# Patient Record
Sex: FEMALE | Race: WHITE | URBAN - NONMETROPOLITAN AREA
[De-identification: names, ages, dates, MRNs, and addresses within clinical notes are randomized per-mention and may not be internally consistent; named-entity substitution may affect disease eponyms.]

---

## 2022-02-14 ENCOUNTER — LAB OUTSIDE AN ENCOUNTER (OUTPATIENT)
Dept: URBAN - NONMETROPOLITAN AREA CLINIC 2 | Facility: CLINIC | Age: 67
End: 2022-02-14

## 2022-02-14 ENCOUNTER — OFFICE VISIT (OUTPATIENT)
Dept: URBAN - NONMETROPOLITAN AREA CLINIC 2 | Facility: CLINIC | Age: 67
End: 2022-02-14
Payer: MEDICARE

## 2022-02-14 ENCOUNTER — WEB ENCOUNTER (OUTPATIENT)
Dept: URBAN - NONMETROPOLITAN AREA CLINIC 2 | Facility: CLINIC | Age: 67
End: 2022-02-14

## 2022-02-14 VITALS
WEIGHT: 170 LBS | SYSTOLIC BLOOD PRESSURE: 115 MMHG | BODY MASS INDEX: 30.12 KG/M2 | HEART RATE: 76 BPM | HEIGHT: 63 IN | DIASTOLIC BLOOD PRESSURE: 77 MMHG

## 2022-02-14 DIAGNOSIS — R93.5 ABNORMAL COMPUTED TOMOGRAPHY OF ABDOMEN AND PELVIS: ICD-10-CM

## 2022-02-14 DIAGNOSIS — R10.9 ABDOMINAL DISCOMFORT: ICD-10-CM

## 2022-02-14 DIAGNOSIS — Z72.0 TOBACCO USE: ICD-10-CM

## 2022-02-14 DIAGNOSIS — Z87.19 HISTORY OF PANCREATITIS: ICD-10-CM

## 2022-02-14 DIAGNOSIS — Z12.11 COLON CANCER SCREENING: ICD-10-CM

## 2022-02-14 PROCEDURE — 99204 OFFICE O/P NEW MOD 45 MIN: CPT | Performed by: INTERNAL MEDICINE

## 2022-02-14 RX ORDER — DICYCLOMINE HYDROCHLORIDE 20 MG/1
TAKE 1 TABLET BY MOUTH 2 TIMES DAILY FOR 10 DAYS TABLET ORAL
Qty: 20 | Refills: 0 | Status: ACTIVE | COMMUNITY

## 2022-02-14 RX ORDER — PROMETHAZINE HYDROCHLORIDE 12.5 MG/1
TAKE 1 TABLET BY MOUTH EVERY 8 HOURS AS NEEDED FOR 10 DAYS TABLET ORAL
Qty: 20 | Refills: 0 | Status: ACTIVE | COMMUNITY

## 2022-02-14 RX ORDER — ONDANSETRON 4 MG/1
DISSOLVE 1 TABLET BY MOUTH EVERY 8 HOURS AS NEEDED FOR NAUSEA TABLET, ORALLY DISINTEGRATING ORAL
Qty: 12 | Refills: 0 | Status: ACTIVE | COMMUNITY

## 2022-02-14 RX ORDER — TRAZODONE HYDROCHLORIDE 100 MG/1
TAKE 1 TABLET BY MOUTH AT NIGHT AS NEEDED FOR 30 DAYS TABLET ORAL
Qty: 30 | Refills: 0 | Status: ACTIVE | COMMUNITY

## 2022-02-14 RX ORDER — PANTOPRAZOLE SODIUM 40 MG/1
TAKE 1 TABLET BY MOUTH EVERY DAY TABLET, DELAYED RELEASE ORAL
Qty: 20 | Refills: 0 | Status: ACTIVE | COMMUNITY

## 2022-02-14 RX ORDER — ZOLPIDEM TARTRATE 10 MG/1
TAKE 1 TABLET BY MOUTH EVERYDAY AT BEDTIME TABLET ORAL
Qty: 30 | Refills: 1 | Status: ACTIVE | COMMUNITY

## 2022-02-14 RX ORDER — ALPRAZOLAM 0.5 MG/1
TAKE 1 TABLET BY MOUTH 1-2 TIMES DAY AS NEEDED FOR ANXIETY TABLET ORAL
Qty: 14 | Refills: 0 | Status: ACTIVE | COMMUNITY

## 2022-02-14 RX ORDER — POLYETHYLENE GLYCOL 3350, SODIUM SULFATE, SODIUM CHLORIDE, POTASSIUM CHLORIDE, ASCORBIC ACID, SODIUM ASCORBATE 140-9-5.2G
AS DIRECTED KIT ORAL AS DIRECTED
Qty: 280 GRAM | Refills: 0 | OUTPATIENT
Start: 2022-02-14 | End: 2022-02-15

## 2022-02-14 RX ORDER — ONDANSETRON 4 MG/1
TAKE 1 TABLET BY MOUTH EVERY 8 HOURS AS NEEDED FOR 10 DAYS TABLET, FILM COATED ORAL
Qty: 30 | Refills: 0 | Status: ACTIVE | COMMUNITY

## 2022-02-14 RX ORDER — ACETAMINOPHEN AND CODEINE PHOSPHATE 300; 30 MG/1; MG/1
TAKE 1-2 TABLETS BY MOUTH EVERY 6 HOURS AS NEEDED FOR PAIN TABLET ORAL
Qty: 25 | Refills: 0 | Status: ACTIVE | COMMUNITY

## 2022-02-14 RX ORDER — BUSPIRONE HYDROCHLORIDE 15 MG/1
TAKE 1 TABLET BY MOUTH TWICE A DAY FOR 30 DAYS TABLET ORAL
Qty: 180 | Refills: 0 | Status: ACTIVE | COMMUNITY

## 2022-02-14 RX ORDER — OMEPRAZOLE 20 MG/1
1 CAPSULE CAPSULE, DELAYED RELEASE ORAL EVERY 12 HOURS
Qty: 180 CAPSULE | Refills: 1 | OUTPATIENT
Start: 2022-02-14

## 2022-02-14 NOTE — HPI-TODAY'S VISIT:
2/14/2022: Initial Gastroenterology Clinic Visit Ms. Bryan is a 66 year old female wtih past medical history of chronic tobacco use, lupus, arthritis, hyperlipidemia, anxiety/depression, who presents for evaluation of pancreatitis.  Ms. Bryan presents with a family member.  She experienced epigastric discomfort in January 2022 which would occur every 30 minutes and last for 10 minutes before returning. She did not associate the discomfort with the consumption of food. To evaluate, underwent CT A/P 1/31/2022 which showed mild fat stranding surrounding the pancreatic head and adjacent duodenum which could represent pancreatitis or peptic ulcer disease, fluid filled milldy prominent loop of small bowel which could represent enteritis. Underwent abdominal ultrasound 2/10/2022 to evaluate for cholelithiasis and assess if gallstones may have led to pancreatitis. Ultrasound showed normal right upper quadrant ultrasound without gallstones. CBC, chemistry panel, LFTs normal.  She did acknowledge being on naproxen 3x per week for arthritis.  Denies alcohol use but has smoked cigarettes for many years. Denies new medications. Denies changes in diet. Denies any recent illnesses.    Ms. Bryan does not currently work. She has not undergone colon cancer screening.

## 2022-03-03 ENCOUNTER — TELEPHONE ENCOUNTER (OUTPATIENT)
Dept: URBAN - NONMETROPOLITAN AREA CLINIC 2 | Facility: CLINIC | Age: 67
End: 2022-03-03

## 2022-03-25 ENCOUNTER — TELEPHONE ENCOUNTER (OUTPATIENT)
Dept: URBAN - NONMETROPOLITAN AREA CLINIC 2 | Facility: CLINIC | Age: 67
End: 2022-03-25

## 2022-04-07 ENCOUNTER — CLAIMS CREATED FROM THE CLAIM WINDOW (OUTPATIENT)
Dept: URBAN - METROPOLITAN AREA CLINIC 4 | Facility: CLINIC | Age: 67
End: 2022-04-07
Payer: MEDICARE

## 2022-04-07 ENCOUNTER — OFFICE VISIT (OUTPATIENT)
Dept: URBAN - NONMETROPOLITAN AREA SURGERY CENTER 1 | Facility: SURGERY CENTER | Age: 67
End: 2022-04-07
Payer: MEDICARE

## 2022-04-07 DIAGNOSIS — K29.60 OTHER GASTRITIS WITHOUT BLEEDING: ICD-10-CM

## 2022-04-07 DIAGNOSIS — K29.60 ADENOPAPILLOMATOSIS GASTRICA: ICD-10-CM

## 2022-04-07 DIAGNOSIS — K29.80 ACUTE DUODENITIS: ICD-10-CM

## 2022-04-07 DIAGNOSIS — D12.2 ADENOMA OF ASCENDING COLON: ICD-10-CM

## 2022-04-07 DIAGNOSIS — B96.81 HELICOBACTER PYLORI [H. PYLORI] AS THE CAUSE OF DISEASES CLASSIFIED ELSEWHERE: ICD-10-CM

## 2022-04-07 DIAGNOSIS — B96.81 BACTERIAL INFECTION DUE TO H. PYLORI: ICD-10-CM

## 2022-04-07 DIAGNOSIS — D12.2 BENIGN NEOPLASM OF ASCENDING COLON: ICD-10-CM

## 2022-04-07 DIAGNOSIS — Z12.11 COLON CANCER SCREENING: ICD-10-CM

## 2022-04-07 DIAGNOSIS — K29.81 DUODENITIS WITH BLEEDING: ICD-10-CM

## 2022-04-07 DIAGNOSIS — K63.89 CYST OF DUODENUM: ICD-10-CM

## 2022-04-07 PROCEDURE — 88342 IMHCHEM/IMCYTCHM 1ST ANTB: CPT | Performed by: PATHOLOGY

## 2022-04-07 PROCEDURE — 45385 COLONOSCOPY W/LESION REMOVAL: CPT | Performed by: INTERNAL MEDICINE

## 2022-04-07 PROCEDURE — 88305 TISSUE EXAM BY PATHOLOGIST: CPT | Performed by: PATHOLOGY

## 2022-04-07 PROCEDURE — G8907 PT DOC NO EVENTS ON DISCHARG: HCPCS | Performed by: INTERNAL MEDICINE

## 2022-04-07 PROCEDURE — 88341 IMHCHEM/IMCYTCHM EA ADD ANTB: CPT | Performed by: PATHOLOGY

## 2022-04-07 PROCEDURE — 43239 EGD BIOPSY SINGLE/MULTIPLE: CPT | Performed by: INTERNAL MEDICINE

## 2022-04-07 RX ORDER — TRAZODONE HYDROCHLORIDE 100 MG/1
TAKE 1 TABLET BY MOUTH AT NIGHT AS NEEDED FOR 30 DAYS TABLET ORAL
Qty: 30 | Refills: 0 | Status: ACTIVE | COMMUNITY

## 2022-04-07 RX ORDER — ALPRAZOLAM 0.5 MG/1
TAKE 1 TABLET BY MOUTH 1-2 TIMES DAY AS NEEDED FOR ANXIETY TABLET ORAL
Qty: 14 | Refills: 0 | Status: ACTIVE | COMMUNITY

## 2022-04-07 RX ORDER — PROMETHAZINE HYDROCHLORIDE 12.5 MG/1
TAKE 1 TABLET BY MOUTH EVERY 8 HOURS AS NEEDED FOR 10 DAYS TABLET ORAL
Qty: 20 | Refills: 0 | Status: ACTIVE | COMMUNITY

## 2022-04-07 RX ORDER — BUSPIRONE HYDROCHLORIDE 15 MG/1
TAKE 1 TABLET BY MOUTH TWICE A DAY FOR 30 DAYS TABLET ORAL
Qty: 180 | Refills: 0 | Status: ACTIVE | COMMUNITY

## 2022-04-07 RX ORDER — DICYCLOMINE HYDROCHLORIDE 20 MG/1
TAKE 1 TABLET BY MOUTH 2 TIMES DAILY FOR 10 DAYS TABLET ORAL
Qty: 20 | Refills: 0 | Status: ACTIVE | COMMUNITY

## 2022-04-07 RX ORDER — ONDANSETRON 4 MG/1
DISSOLVE 1 TABLET BY MOUTH EVERY 8 HOURS AS NEEDED FOR NAUSEA TABLET, ORALLY DISINTEGRATING ORAL
Qty: 12 | Refills: 0 | Status: ACTIVE | COMMUNITY

## 2022-04-07 RX ORDER — ONDANSETRON 4 MG/1
TAKE 1 TABLET BY MOUTH EVERY 8 HOURS AS NEEDED FOR 10 DAYS TABLET, FILM COATED ORAL
Qty: 30 | Refills: 0 | Status: ACTIVE | COMMUNITY

## 2022-04-07 RX ORDER — OMEPRAZOLE 20 MG/1
1 CAPSULE CAPSULE, DELAYED RELEASE ORAL EVERY 12 HOURS
Qty: 180 CAPSULE | Refills: 1 | Status: ACTIVE | COMMUNITY
Start: 2022-02-14

## 2022-04-07 RX ORDER — ACETAMINOPHEN AND CODEINE PHOSPHATE 300; 30 MG/1; MG/1
TAKE 1-2 TABLETS BY MOUTH EVERY 6 HOURS AS NEEDED FOR PAIN TABLET ORAL
Qty: 25 | Refills: 0 | Status: ACTIVE | COMMUNITY

## 2022-04-07 RX ORDER — PANTOPRAZOLE SODIUM 40 MG/1
TAKE 1 TABLET BY MOUTH EVERY DAY TABLET, DELAYED RELEASE ORAL
Qty: 20 | Refills: 0 | Status: ACTIVE | COMMUNITY

## 2022-04-07 RX ORDER — ZOLPIDEM TARTRATE 10 MG/1
TAKE 1 TABLET BY MOUTH EVERYDAY AT BEDTIME TABLET ORAL
Qty: 30 | Refills: 1 | Status: ACTIVE | COMMUNITY

## 2022-04-18 ENCOUNTER — OFFICE VISIT (OUTPATIENT)
Dept: URBAN - NONMETROPOLITAN AREA CLINIC 2 | Facility: CLINIC | Age: 67
End: 2022-04-18
Payer: MEDICARE

## 2022-04-18 VITALS
SYSTOLIC BLOOD PRESSURE: 119 MMHG | BODY MASS INDEX: 30.12 KG/M2 | WEIGHT: 170 LBS | DIASTOLIC BLOOD PRESSURE: 90 MMHG | HEART RATE: 81 BPM | HEIGHT: 63 IN

## 2022-04-18 DIAGNOSIS — K25.9 GASTRIC ULCER WITHOUT HEMORRHAGE OR PERFORATION, UNSPECIFIED CHRONICITY: ICD-10-CM

## 2022-04-18 DIAGNOSIS — Z87.19 HISTORY OF PANCREATITIS: ICD-10-CM

## 2022-04-18 DIAGNOSIS — A04.8 HELICOBACTER PYLORI (H. PYLORI) INFECTION: ICD-10-CM

## 2022-04-18 DIAGNOSIS — Z86.010 PERSONAL HISTORY OF COLONIC POLYPS: ICD-10-CM

## 2022-04-18 DIAGNOSIS — R93.5 ABNORMAL COMPUTED TOMOGRAPHY OF ABDOMEN AND PELVIS: ICD-10-CM

## 2022-04-18 DIAGNOSIS — Z72.0 TOBACCO USE: ICD-10-CM

## 2022-04-18 PROCEDURE — 99214 OFFICE O/P EST MOD 30 MIN: CPT | Performed by: INTERNAL MEDICINE

## 2022-04-18 RX ORDER — ONDANSETRON 4 MG/1
TAKE 1 TABLET BY MOUTH EVERY 8 HOURS AS NEEDED FOR 10 DAYS TABLET, FILM COATED ORAL
Qty: 30 | Refills: 0 | Status: ACTIVE | COMMUNITY

## 2022-04-18 RX ORDER — ALPRAZOLAM 0.5 MG/1
TAKE 1 TABLET BY MOUTH 1-2 TIMES DAY AS NEEDED FOR ANXIETY TABLET ORAL
Qty: 14 | Refills: 0 | Status: ACTIVE | COMMUNITY

## 2022-04-18 RX ORDER — ACETAMINOPHEN AND CODEINE PHOSPHATE 300; 30 MG/1; MG/1
TAKE 1-2 TABLETS BY MOUTH EVERY 6 HOURS AS NEEDED FOR PAIN TABLET ORAL
Qty: 25 | Refills: 0 | Status: ACTIVE | COMMUNITY

## 2022-04-18 RX ORDER — DICYCLOMINE HYDROCHLORIDE 20 MG/1
TAKE 1 TABLET BY MOUTH 2 TIMES DAILY FOR 10 DAYS TABLET ORAL
Qty: 20 | Refills: 0 | Status: ACTIVE | COMMUNITY

## 2022-04-18 RX ORDER — OMEPRAZOLE 20 MG/1
1 CAPSULE CAPSULE, DELAYED RELEASE ORAL EVERY 12 HOURS
Qty: 180 CAPSULE | Refills: 3 | OUTPATIENT
Start: 2022-04-18

## 2022-04-18 RX ORDER — ONDANSETRON 4 MG/1
DISSOLVE 1 TABLET BY MOUTH EVERY 8 HOURS AS NEEDED FOR NAUSEA TABLET, ORALLY DISINTEGRATING ORAL
Qty: 12 | Refills: 0 | Status: ACTIVE | COMMUNITY

## 2022-04-18 RX ORDER — ZOLPIDEM TARTRATE 10 MG/1
TAKE 1 TABLET BY MOUTH EVERYDAY AT BEDTIME TABLET ORAL
Qty: 30 | Refills: 1 | Status: ACTIVE | COMMUNITY

## 2022-04-18 RX ORDER — BUSPIRONE HYDROCHLORIDE 15 MG/1
TAKE 1 TABLET BY MOUTH TWICE A DAY FOR 30 DAYS TABLET ORAL
Qty: 180 | Refills: 0 | Status: ACTIVE | COMMUNITY

## 2022-04-18 RX ORDER — SODIUM SULFATE, MAGNESIUM SULFATE, AND POTASSIUM CHLORIDE 17.75; 2.7; 2.25 G/1; G/1; G/1
12 TABLETS TABLET ORAL
Qty: 24 TABLETS | Refills: 0 | OUTPATIENT
Start: 2022-04-18 | End: 2022-04-19

## 2022-04-18 RX ORDER — PROMETHAZINE HYDROCHLORIDE 12.5 MG/1
TAKE 1 TABLET BY MOUTH EVERY 8 HOURS AS NEEDED FOR 10 DAYS TABLET ORAL
Qty: 20 | Refills: 0 | Status: ACTIVE | COMMUNITY

## 2022-04-18 RX ORDER — PANTOPRAZOLE SODIUM 40 MG/1
TAKE 1 TABLET BY MOUTH EVERY DAY TABLET, DELAYED RELEASE ORAL
Qty: 20 | Refills: 0 | Status: ACTIVE | COMMUNITY

## 2022-04-18 RX ORDER — OMEPRAZOLE 20 MG/1
1 CAPSULE CAPSULE, DELAYED RELEASE ORAL EVERY 12 HOURS
Qty: 180 CAPSULE | Refills: 1 | OUTPATIENT

## 2022-04-18 RX ORDER — TRAZODONE HYDROCHLORIDE 100 MG/1
TAKE 1 TABLET BY MOUTH AT NIGHT AS NEEDED FOR 30 DAYS TABLET ORAL
Qty: 30 | Refills: 0 | Status: ACTIVE | COMMUNITY

## 2022-04-18 RX ORDER — BISMUTH SUBCITRATE POTASSIUM, METRONIDAZOLE, TETRACYCLINE HYDROCHLORIDE 140; 125; 125 MG/1; MG/1; MG/1
3 CAPSULES AFTER MEALS AND AT BEDTIME CAPSULE ORAL
Qty: 120 | OUTPATIENT
Start: 2022-04-18 | End: 2022-04-28

## 2022-04-18 NOTE — HPI-TODAY'S VISIT:
2/14/2022: Initial Gastroenterology Clinic Visit   Ms. Bryan is a 66 year old female wtih past medical history of chronic tobacco use, lupus, arthritis, hyperlipidemia, anxiety/depression, who presents for evaluation of pancreatitis.  Ms. Bryan presents with a family member.  She experienced epigastric discomfort in January 2022 which would occur every 30 minutes and last for 10 minutes before returning. She did not associate the discomfort with the consumption of food. To evaluate, underwent CT A/P 1/31/2022 which showed mild fat stranding surrounding the pancreatic head and adjacent duodenum which could represent pancreatitis or peptic ulcer disease, fluid filled milldy prominent loop of small bowel which could represent enteritis. Underwent abdominal ultrasound 2/10/2022 to evaluate for cholelithiasis and assess if gallstones may have led to pancreatitis. Ultrasound showed normal right upper quadrant ultrasound without gallstones. CBC, chemistry panel, LFTs normal.  She did acknowledge being on naproxen 3x per week for arthritis.  Denies alcohol use but has smoked cigarettes for many years. Denies new medications. Denies changes in diet. Denies any recent illnesses.    Ms. Bryan does not currently work. She has not undergone colon cancer screening.  4/7/2022: EGD One linear esophageal ulcer was found at the distal esophagus measuring 3 mm in size.  3 cm hiatal hernia was present. Two non-bleeding gastric ulcers with no bleeding were found in the gastric antrum with largest measuring 5 mm. Biopsied. Congested mucosa without active bleeding was found in the duodenal bulb, first portion of the duodenum, and second portion of the duodenum. Biopsied. 4/7/2022: Pathology from EGD (A) Duodenum, Bulb, Biopsy: ACTIVE PEPTIC DUODENITIS, PROBABLY SECONDARY TO H. PYLORI INFECTION; H. PYLORI ORGANISMS IDENTIFIED. Negative for Sprue, Dysplasia or Malignancy. (B) Stomach, Body, Biopsy: CHRONIC HELICOBACTER GASTRITIS; H. PYLORI ORGANISMS IDENTIFIED. NO EVIDENCE OF INTESTINAL METAPLASIA. Negative For Dysplasia or Malignancy. 4/7/2022: Colonoscopy  Seven sessile polyps were found in the ascending colon ranging from 5-15 mm. Polypectomy performed with cold snare with largest polyp removed in piecemeal fashion.  4 mm polyp in the descending colon. Polypectomy performed. 10 mm polyp in the sigmoid colon. Polypectomy performed. Multiple small and large mouthed diverticula were found in the descending colon and sigmoid colon. 4/7/2022: Pathology from Colonoscopy  (C) Colon, Ascending, Polypectomy: TUBULAR ADENOMA(S). (D) Colon, Descending, Polypectomy: HYPERPLASTIC POLYP(S). (E) Colon, Sigmoid, Polypectomy: HYPERPLASTIC POLYP(S).  4/18/2022: Gastroenterology Follow-Up Visit Ms. Bryan acknowledges that she was unable to undergo the MRI Pancreas. She does continue to smoke. She is in the process of decreasing NSAID use. She does have epigastric discomfort although this has slightly improved with PPI. Denies melena, hematochezia, unintentional weight loss.

## 2022-05-04 PROBLEM — 305058001: Status: ACTIVE | Noted: 2022-02-14

## 2022-07-20 ENCOUNTER — TELEPHONE ENCOUNTER (OUTPATIENT)
Dept: URBAN - NONMETROPOLITAN AREA CLINIC 2 | Facility: CLINIC | Age: 67
End: 2022-07-20

## 2022-08-10 PROBLEM — 365980008: Status: ACTIVE | Noted: 2022-02-26

## 2022-08-10 PROBLEM — 85921000119107: Status: ACTIVE | Noted: 2022-02-26

## 2022-08-19 ENCOUNTER — OFFICE VISIT (OUTPATIENT)
Dept: URBAN - NONMETROPOLITAN AREA SURGERY CENTER 1 | Facility: SURGERY CENTER | Age: 67
End: 2022-08-19

## 2022-08-26 ENCOUNTER — TELEPHONE ENCOUNTER (OUTPATIENT)
Dept: URBAN - NONMETROPOLITAN AREA CLINIC 2 | Facility: CLINIC | Age: 67
End: 2022-08-26

## 2022-08-29 ENCOUNTER — OFFICE VISIT (OUTPATIENT)
Dept: URBAN - NONMETROPOLITAN AREA CLINIC 2 | Facility: CLINIC | Age: 67
End: 2022-08-29

## 2022-09-14 ENCOUNTER — TELEPHONE ENCOUNTER (OUTPATIENT)
Dept: URBAN - NONMETROPOLITAN AREA CLINIC 2 | Facility: CLINIC | Age: 67
End: 2022-09-14

## 2022-09-14 RX ORDER — PROMETHAZINE HYDROCHLORIDE 12.5 MG/1
TAKE 1 TABLET BY MOUTH EVERY 8 HOURS AS NEEDED FOR 10 DAYS TABLET ORAL
Qty: 20 | Refills: 0 | Status: ACTIVE | COMMUNITY

## 2022-09-14 RX ORDER — ONDANSETRON 4 MG/1
TAKE 1 TABLET BY MOUTH EVERY 8 HOURS AS NEEDED FOR 10 DAYS TABLET, FILM COATED ORAL
Qty: 30 | Refills: 0 | Status: ACTIVE | COMMUNITY

## 2022-09-14 RX ORDER — PANTOPRAZOLE SODIUM 40 MG/1
TAKE 1 TABLET BY MOUTH EVERY DAY TABLET, DELAYED RELEASE ORAL
Qty: 20 | Refills: 0 | Status: ACTIVE | COMMUNITY

## 2022-09-14 RX ORDER — TRAZODONE HYDROCHLORIDE 100 MG/1
TAKE 1 TABLET BY MOUTH AT NIGHT AS NEEDED FOR 30 DAYS TABLET ORAL
Qty: 30 | Refills: 0 | Status: ACTIVE | COMMUNITY

## 2022-09-14 RX ORDER — BUSPIRONE HYDROCHLORIDE 15 MG/1
TAKE 1 TABLET BY MOUTH TWICE A DAY FOR 30 DAYS TABLET ORAL
Qty: 180 | Refills: 0 | Status: ACTIVE | COMMUNITY

## 2022-09-14 RX ORDER — ALPRAZOLAM 0.5 MG/1
TAKE 1 TABLET BY MOUTH 1-2 TIMES DAY AS NEEDED FOR ANXIETY TABLET ORAL
Qty: 14 | Refills: 0 | Status: ACTIVE | COMMUNITY

## 2022-09-14 RX ORDER — ZOLPIDEM TARTRATE 10 MG/1
TAKE 1 TABLET BY MOUTH EVERYDAY AT BEDTIME TABLET ORAL
Qty: 30 | Refills: 1 | Status: ACTIVE | COMMUNITY

## 2022-09-14 RX ORDER — OMEPRAZOLE 20 MG/1
1 CAPSULE CAPSULE, DELAYED RELEASE ORAL EVERY 12 HOURS
Qty: 180 CAPSULE | Refills: 1 | Status: ACTIVE | COMMUNITY

## 2022-09-14 RX ORDER — LORAZEPAM 0.5 MG/1
1 TABLET TABLET ORAL
Qty: 1 TABLET | Refills: 0 | OUTPATIENT
Start: 2022-09-14

## 2022-09-14 RX ORDER — ACETAMINOPHEN AND CODEINE PHOSPHATE 300; 30 MG/1; MG/1
TAKE 1-2 TABLETS BY MOUTH EVERY 6 HOURS AS NEEDED FOR PAIN TABLET ORAL
Qty: 25 | Refills: 0 | Status: ACTIVE | COMMUNITY

## 2022-09-14 RX ORDER — DICYCLOMINE HYDROCHLORIDE 20 MG/1
TAKE 1 TABLET BY MOUTH 2 TIMES DAILY FOR 10 DAYS TABLET ORAL
Qty: 20 | Refills: 0 | Status: ACTIVE | COMMUNITY

## 2022-09-14 RX ORDER — OMEPRAZOLE 20 MG/1
1 CAPSULE CAPSULE, DELAYED RELEASE ORAL EVERY 12 HOURS
Qty: 180 CAPSULE | Refills: 3 | Status: ACTIVE | COMMUNITY
Start: 2022-04-18

## 2022-09-14 RX ORDER — SODIUM SULFATE, MAGNESIUM SULFATE, AND POTASSIUM CHLORIDE 17.75; 2.7; 2.25 G/1; G/1; G/1
12 TABLETS TABLET ORAL
Qty: 24 TABLETS | Refills: 0 | OUTPATIENT
Start: 2022-09-15 | End: 2022-09-16

## 2022-09-14 RX ORDER — ONDANSETRON 4 MG/1
DISSOLVE 1 TABLET BY MOUTH EVERY 8 HOURS AS NEEDED FOR NAUSEA TABLET, ORALLY DISINTEGRATING ORAL
Qty: 12 | Refills: 0 | Status: ACTIVE | COMMUNITY

## 2022-09-15 PROBLEM — 15634181000119107: Status: ACTIVE | Noted: 2022-02-14

## 2022-09-20 ENCOUNTER — TELEPHONE ENCOUNTER (OUTPATIENT)
Dept: URBAN - NONMETROPOLITAN AREA CLINIC 2 | Facility: CLINIC | Age: 67
End: 2022-09-20

## 2022-09-23 ENCOUNTER — OFFICE VISIT (OUTPATIENT)
Dept: URBAN - NONMETROPOLITAN AREA SURGERY CENTER 1 | Facility: SURGERY CENTER | Age: 67
End: 2022-09-23
Payer: MEDICARE

## 2022-09-23 ENCOUNTER — CLAIMS CREATED FROM THE CLAIM WINDOW (OUTPATIENT)
Dept: URBAN - METROPOLITAN AREA CLINIC 4 | Facility: CLINIC | Age: 67
End: 2022-09-23
Payer: MEDICARE

## 2022-09-23 ENCOUNTER — TELEPHONE ENCOUNTER (OUTPATIENT)
Dept: URBAN - NONMETROPOLITAN AREA CLINIC 2 | Facility: CLINIC | Age: 67
End: 2022-09-23

## 2022-09-23 DIAGNOSIS — K29.60 OTHER GASTRITIS WITHOUT BLEEDING: ICD-10-CM

## 2022-09-23 DIAGNOSIS — K29.80 ACUTE DUODENITIS: ICD-10-CM

## 2022-09-23 DIAGNOSIS — Z53.8 FAILED ATTEMPTED SURGICAL PROCEDURE: ICD-10-CM

## 2022-09-23 DIAGNOSIS — K29.81 DUODENITIS WITH BLEEDING: ICD-10-CM

## 2022-09-23 DIAGNOSIS — B96.81 BACTERIAL INFECTION DUE TO H. PYLORI: ICD-10-CM

## 2022-09-23 DIAGNOSIS — K29.60 ADENOPAPILLOMATOSIS GASTRICA: ICD-10-CM

## 2022-09-23 DIAGNOSIS — B96.81 HELICOBACTER PYLORI [H. PYLORI] AS THE CAUSE OF DISEASES CLASSIFIED ELSEWHERE: ICD-10-CM

## 2022-09-23 DIAGNOSIS — Z86.010 ADENOMAS PERSONAL HISTORY OF COLONIC POLYPS: ICD-10-CM

## 2022-09-23 PROBLEM — 30811009: Status: ACTIVE | Noted: 2022-09-23

## 2022-09-23 PROCEDURE — 88341 IMHCHEM/IMCYTCHM EA ADD ANTB: CPT | Performed by: PATHOLOGY

## 2022-09-23 PROCEDURE — 88305 TISSUE EXAM BY PATHOLOGIST: CPT | Performed by: PATHOLOGY

## 2022-09-23 PROCEDURE — 43239 EGD BIOPSY SINGLE/MULTIPLE: CPT | Performed by: INTERNAL MEDICINE

## 2022-09-23 PROCEDURE — G0105 COLORECTAL SCRN; HI RISK IND: HCPCS | Performed by: INTERNAL MEDICINE

## 2022-09-23 PROCEDURE — G8907 PT DOC NO EVENTS ON DISCHARG: HCPCS | Performed by: INTERNAL MEDICINE

## 2022-09-23 PROCEDURE — 88342 IMHCHEM/IMCYTCHM 1ST ANTB: CPT | Performed by: PATHOLOGY

## 2022-09-23 RX ORDER — ACETAMINOPHEN AND CODEINE PHOSPHATE 300; 30 MG/1; MG/1
TAKE 1-2 TABLETS BY MOUTH EVERY 6 HOURS AS NEEDED FOR PAIN TABLET ORAL
Qty: 25 | Refills: 0 | Status: ACTIVE | COMMUNITY

## 2022-09-23 RX ORDER — DICYCLOMINE HYDROCHLORIDE 20 MG/1
TAKE 1 TABLET BY MOUTH 2 TIMES DAILY FOR 10 DAYS TABLET ORAL
Qty: 20 | Refills: 0 | Status: ACTIVE | COMMUNITY

## 2022-09-23 RX ORDER — OMEPRAZOLE 20 MG/1
1 CAPSULE CAPSULE, DELAYED RELEASE ORAL EVERY 12 HOURS
Qty: 180 CAPSULE | Refills: 0 | OUTPATIENT
Start: 2022-09-23

## 2022-09-23 RX ORDER — PANTOPRAZOLE SODIUM 40 MG/1
TAKE 1 TABLET BY MOUTH EVERY DAY TABLET, DELAYED RELEASE ORAL
Qty: 20 | Refills: 0 | Status: ACTIVE | COMMUNITY

## 2022-09-23 RX ORDER — OMEPRAZOLE 20 MG/1
1 CAPSULE CAPSULE, DELAYED RELEASE ORAL EVERY 12 HOURS
Qty: 180 CAPSULE | Refills: 1 | Status: ACTIVE | COMMUNITY

## 2022-09-23 RX ORDER — ZOLPIDEM TARTRATE 10 MG/1
TAKE 1 TABLET BY MOUTH EVERYDAY AT BEDTIME TABLET ORAL
Qty: 30 | Refills: 1 | Status: ACTIVE | COMMUNITY

## 2022-09-23 RX ORDER — LORAZEPAM 0.5 MG/1
1 TABLET TABLET ORAL
Qty: 1 TABLET | Refills: 0 | Status: ACTIVE | COMMUNITY
Start: 2022-09-14

## 2022-09-23 RX ORDER — PROMETHAZINE HYDROCHLORIDE 12.5 MG/1
TAKE 1 TABLET BY MOUTH EVERY 8 HOURS AS NEEDED FOR 10 DAYS TABLET ORAL
Qty: 20 | Refills: 0 | Status: ACTIVE | COMMUNITY

## 2022-09-23 RX ORDER — ONDANSETRON 4 MG/1
DISSOLVE 1 TABLET BY MOUTH EVERY 8 HOURS AS NEEDED FOR NAUSEA TABLET, ORALLY DISINTEGRATING ORAL
Qty: 12 | Refills: 0 | Status: ACTIVE | COMMUNITY

## 2022-09-23 RX ORDER — BUSPIRONE HYDROCHLORIDE 15 MG/1
TAKE 1 TABLET BY MOUTH TWICE A DAY FOR 30 DAYS TABLET ORAL
Qty: 180 | Refills: 0 | Status: ACTIVE | COMMUNITY

## 2022-09-23 RX ORDER — OMEPRAZOLE 20 MG/1
1 CAPSULE CAPSULE, DELAYED RELEASE ORAL EVERY 12 HOURS
Qty: 180 CAPSULE | Refills: 3 | Status: ACTIVE | COMMUNITY
Start: 2022-04-18

## 2022-09-23 RX ORDER — TRAZODONE HYDROCHLORIDE 100 MG/1
TAKE 1 TABLET BY MOUTH AT NIGHT AS NEEDED FOR 30 DAYS TABLET ORAL
Qty: 30 | Refills: 0 | Status: ACTIVE | COMMUNITY

## 2022-09-23 RX ORDER — ONDANSETRON 4 MG/1
TAKE 1 TABLET BY MOUTH EVERY 8 HOURS AS NEEDED FOR 10 DAYS TABLET, FILM COATED ORAL
Qty: 30 | Refills: 0 | Status: ACTIVE | COMMUNITY

## 2022-09-23 RX ORDER — ALPRAZOLAM 0.5 MG/1
TAKE 1 TABLET BY MOUTH 1-2 TIMES DAY AS NEEDED FOR ANXIETY TABLET ORAL
Qty: 14 | Refills: 0 | Status: ACTIVE | COMMUNITY

## 2022-10-27 ENCOUNTER — TELEPHONE ENCOUNTER (OUTPATIENT)
Dept: URBAN - NONMETROPOLITAN AREA CLINIC 2 | Facility: CLINIC | Age: 67
End: 2022-10-27

## 2022-10-27 RX ORDER — ALPRAZOLAM 0.5 MG/1
TAKE 1 TABLET BY MOUTH 1-2 TIMES DAY AS NEEDED FOR ANXIETY TABLET ORAL
Qty: 14 | Refills: 0 | Status: ACTIVE | COMMUNITY

## 2022-10-27 RX ORDER — TRAZODONE HYDROCHLORIDE 100 MG/1
TAKE 1 TABLET BY MOUTH AT NIGHT AS NEEDED FOR 30 DAYS TABLET ORAL
Qty: 30 | Refills: 0 | Status: ACTIVE | COMMUNITY

## 2022-10-27 RX ORDER — OMEPRAZOLE 20 MG/1
1 CAPSULE CAPSULE, DELAYED RELEASE ORAL EVERY 12 HOURS
Qty: 180 CAPSULE | Refills: 0 | Status: ACTIVE | COMMUNITY
Start: 2022-09-23

## 2022-10-27 RX ORDER — OMEPRAZOLE 20 MG/1
1 CAPSULE CAPSULE, DELAYED RELEASE ORAL EVERY 12 HOURS
Qty: 180 CAPSULE | Refills: 1 | Status: ACTIVE | COMMUNITY

## 2022-10-27 RX ORDER — PROMETHAZINE HYDROCHLORIDE 12.5 MG/1
TAKE 1 TABLET BY MOUTH EVERY 8 HOURS AS NEEDED FOR 10 DAYS TABLET ORAL
Qty: 20 | Refills: 0 | Status: ACTIVE | COMMUNITY

## 2022-10-27 RX ORDER — BUSPIRONE HYDROCHLORIDE 15 MG/1
TAKE 1 TABLET BY MOUTH TWICE A DAY FOR 30 DAYS TABLET ORAL
Qty: 180 | Refills: 0 | Status: ACTIVE | COMMUNITY

## 2022-10-27 RX ORDER — LORAZEPAM 0.5 MG/1
1 TABLET TABLET ORAL
Qty: 1 TABLET | Refills: 0 | Status: ACTIVE | COMMUNITY
Start: 2022-09-14

## 2022-10-27 RX ORDER — PANTOPRAZOLE SODIUM 40 MG/1
TAKE 1 TABLET BY MOUTH EVERY DAY TABLET, DELAYED RELEASE ORAL
Qty: 20 | Refills: 0 | Status: ACTIVE | COMMUNITY

## 2022-10-27 RX ORDER — DICYCLOMINE HYDROCHLORIDE 20 MG/1
TAKE 1 TABLET BY MOUTH 2 TIMES DAILY FOR 10 DAYS TABLET ORAL
Qty: 20 | Refills: 0 | Status: ACTIVE | COMMUNITY

## 2022-10-27 RX ORDER — ACETAMINOPHEN AND CODEINE PHOSPHATE 300; 30 MG/1; MG/1
TAKE 1-2 TABLETS BY MOUTH EVERY 6 HOURS AS NEEDED FOR PAIN TABLET ORAL
Qty: 25 | Refills: 0 | Status: ACTIVE | COMMUNITY

## 2022-10-27 RX ORDER — ZOLPIDEM TARTRATE 10 MG/1
TAKE 1 TABLET BY MOUTH EVERYDAY AT BEDTIME TABLET ORAL
Qty: 30 | Refills: 1 | Status: ACTIVE | COMMUNITY

## 2022-10-27 RX ORDER — ONDANSETRON 4 MG/1
TAKE 1 TABLET BY MOUTH EVERY 8 HOURS AS NEEDED FOR 10 DAYS TABLET, FILM COATED ORAL
Qty: 30 | Refills: 0 | Status: ACTIVE | COMMUNITY

## 2022-10-27 RX ORDER — BISMUTH SUBCITRATE POTASSIUM, METRONIDAZOLE, TETRACYCLINE HYDROCHLORIDE 140; 125; 125 MG/1; MG/1; MG/1
3 CAPSULES AFTER MEALS AND AT BEDTIME CAPSULE ORAL
Qty: 120 | OUTPATIENT
Start: 2022-11-15 | End: 2022-11-25

## 2022-10-27 RX ORDER — ONDANSETRON 4 MG/1
DISSOLVE 1 TABLET BY MOUTH EVERY 8 HOURS AS NEEDED FOR NAUSEA TABLET, ORALLY DISINTEGRATING ORAL
Qty: 12 | Refills: 0 | Status: ACTIVE | COMMUNITY

## 2022-10-27 RX ORDER — OMEPRAZOLE 20 MG/1
1 CAPSULE CAPSULE, DELAYED RELEASE ORAL EVERY 12 HOURS
Qty: 180 CAPSULE | Refills: 3 | Status: ACTIVE | COMMUNITY
Start: 2022-04-18

## 2022-11-18 ENCOUNTER — TELEPHONE ENCOUNTER (OUTPATIENT)
Dept: URBAN - NONMETROPOLITAN AREA CLINIC 2 | Facility: CLINIC | Age: 67
End: 2022-11-18

## 2022-11-28 ENCOUNTER — OFFICE VISIT (OUTPATIENT)
Dept: URBAN - NONMETROPOLITAN AREA CLINIC 2 | Facility: CLINIC | Age: 67
End: 2022-11-28

## 2023-03-23 ENCOUNTER — OFFICE VISIT (OUTPATIENT)
Dept: URBAN - NONMETROPOLITAN AREA CLINIC 13 | Facility: CLINIC | Age: 68
End: 2023-03-23

## 2023-05-19 ENCOUNTER — OFFICE VISIT (OUTPATIENT)
Dept: URBAN - NONMETROPOLITAN AREA CLINIC 13 | Facility: CLINIC | Age: 68
End: 2023-05-19

## 2023-06-02 ENCOUNTER — OFFICE VISIT (OUTPATIENT)
Dept: URBAN - NONMETROPOLITAN AREA CLINIC 13 | Facility: CLINIC | Age: 68
End: 2023-06-02

## 2023-07-31 ENCOUNTER — OFFICE VISIT (OUTPATIENT)
Dept: URBAN - NONMETROPOLITAN AREA CLINIC 2 | Facility: CLINIC | Age: 68
End: 2023-07-31

## 2023-11-09 ENCOUNTER — OFFICE VISIT (OUTPATIENT)
Dept: URBAN - NONMETROPOLITAN AREA CLINIC 13 | Facility: CLINIC | Age: 68
End: 2023-11-09
Payer: MEDICARE

## 2023-11-09 ENCOUNTER — DASHBOARD ENCOUNTERS (OUTPATIENT)
Age: 68
End: 2023-11-09

## 2023-11-09 ENCOUNTER — LAB OUTSIDE AN ENCOUNTER (OUTPATIENT)
Dept: URBAN - NONMETROPOLITAN AREA CLINIC 13 | Facility: CLINIC | Age: 68
End: 2023-11-09

## 2023-11-09 VITALS
HEIGHT: 63 IN | HEART RATE: 93 BPM | BODY MASS INDEX: 29.62 KG/M2 | WEIGHT: 167.2 LBS | DIASTOLIC BLOOD PRESSURE: 82 MMHG | SYSTOLIC BLOOD PRESSURE: 135 MMHG

## 2023-11-09 DIAGNOSIS — K25.9 GASTRIC ULCER WITHOUT HEMORRHAGE OR PERFORATION, UNSPECIFIED CHRONICITY: ICD-10-CM

## 2023-11-09 DIAGNOSIS — A04.8 OTHER SPECIFIED BACTERIAL INTESTINAL INFECTIONS: ICD-10-CM

## 2023-11-09 DIAGNOSIS — Z86.010 PERSONAL HISTORY OF COLONIC POLYPS: ICD-10-CM

## 2023-11-09 DIAGNOSIS — K22.10 ULCER OF ESOPHAGUS WITHOUT BLEEDING: ICD-10-CM

## 2023-11-09 PROBLEM — 721730009: Status: ACTIVE | Noted: 2022-04-18

## 2023-11-09 PROBLEM — 428283002: Status: ACTIVE | Noted: 2022-04-18

## 2023-11-09 PROBLEM — 73481001: Status: ACTIVE | Noted: 2022-04-18

## 2023-11-09 PROCEDURE — 99214 OFFICE O/P EST MOD 30 MIN: CPT | Performed by: NURSE PRACTITIONER

## 2023-11-09 RX ORDER — PANTOPRAZOLE SODIUM 40 MG/1
TAKE 1 TABLET BY MOUTH EVERY DAY TABLET, DELAYED RELEASE ORAL
Qty: 20 | Refills: 0 | Status: ACTIVE | COMMUNITY

## 2023-11-09 RX ORDER — PANTOPRAZOLE SODIUM 40 MG/1
1 TABLET BEFORE BREAKFAST AND EVENING MEAL TABLET, DELAYED RELEASE ORAL TWICE DAILY
Qty: 60 | Refills: 5 | OUTPATIENT
Start: 2023-11-09

## 2023-11-09 RX ORDER — PROMETHAZINE HYDROCHLORIDE 12.5 MG/1
TAKE 1 TABLET BY MOUTH EVERY 8 HOURS AS NEEDED FOR 10 DAYS TABLET ORAL
Qty: 20 | Refills: 0 | Status: ACTIVE | COMMUNITY

## 2023-11-09 RX ORDER — OMEPRAZOLE 20 MG/1
1 CAPSULE CAPSULE, DELAYED RELEASE ORAL EVERY 12 HOURS
Qty: 180 CAPSULE | Refills: 3 | Status: ACTIVE | COMMUNITY
Start: 2022-04-18

## 2023-11-09 RX ORDER — ONDANSETRON 4 MG/1
TAKE 1 TABLET BY MOUTH EVERY 8 HOURS AS NEEDED FOR 10 DAYS TABLET, FILM COATED ORAL
Qty: 30 | Refills: 0 | Status: ACTIVE | COMMUNITY

## 2023-11-09 RX ORDER — ALPRAZOLAM 0.5 MG/1
TAKE 1 TABLET BY MOUTH 1-2 TIMES DAY AS NEEDED FOR ANXIETY TABLET ORAL
Qty: 14 | Refills: 0 | Status: ACTIVE | COMMUNITY

## 2023-11-09 RX ORDER — BUSPIRONE HYDROCHLORIDE 15 MG/1
TAKE 1 TABLET BY MOUTH TWICE A DAY FOR 30 DAYS TABLET ORAL
Qty: 180 | Refills: 0 | Status: ACTIVE | COMMUNITY

## 2023-11-09 RX ORDER — OMEPRAZOLE 20 MG/1
1 CAPSULE CAPSULE, DELAYED RELEASE ORAL EVERY 12 HOURS
Qty: 180 CAPSULE | Refills: 0 | Status: ACTIVE | COMMUNITY
Start: 2022-09-23

## 2023-11-09 RX ORDER — ZOLPIDEM TARTRATE 10 MG/1
TAKE 1 TABLET BY MOUTH EVERYDAY AT BEDTIME TABLET ORAL
Qty: 30 | Refills: 1 | Status: ACTIVE | COMMUNITY

## 2023-11-09 RX ORDER — ONDANSETRON 4 MG/1
DISSOLVE 1 TABLET BY MOUTH EVERY 8 HOURS AS NEEDED FOR NAUSEA TABLET, ORALLY DISINTEGRATING ORAL
Qty: 12 | Refills: 0 | Status: ACTIVE | COMMUNITY

## 2023-11-09 RX ORDER — OMEPRAZOLE 20 MG/1
1 CAPSULE CAPSULE, DELAYED RELEASE ORAL EVERY 12 HOURS
Qty: 180 CAPSULE | Refills: 1 | Status: ACTIVE | COMMUNITY

## 2023-11-09 RX ORDER — LORAZEPAM 0.5 MG/1
1 TABLET TABLET ORAL
Qty: 1 TABLET | Refills: 0 | Status: ACTIVE | COMMUNITY
Start: 2022-09-14

## 2023-11-09 RX ORDER — TRAZODONE HYDROCHLORIDE 100 MG/1
TAKE 1 TABLET BY MOUTH AT NIGHT AS NEEDED FOR 30 DAYS TABLET ORAL
Qty: 30 | Refills: 0 | Status: ACTIVE | COMMUNITY

## 2023-11-09 RX ORDER — DICYCLOMINE HYDROCHLORIDE 20 MG/1
TAKE 1 TABLET BY MOUTH 2 TIMES DAILY FOR 10 DAYS TABLET ORAL
Qty: 20 | Refills: 0 | Status: ACTIVE | COMMUNITY

## 2023-11-09 RX ORDER — ACETAMINOPHEN AND CODEINE PHOSPHATE 300; 30 MG/1; MG/1
TAKE 1-2 TABLETS BY MOUTH EVERY 6 HOURS AS NEEDED FOR PAIN TABLET ORAL
Qty: 25 | Refills: 0 | Status: ACTIVE | COMMUNITY

## 2023-11-09 NOTE — HPI-TODAY'S VISIT:
Ms. Sonia Bryan is a 68-year-old female who presents today for hospital follow-up.  She was recently seen at [facility] for chest pain and discomfort in her epigastric.  She was admitted for atypical chest pain secondary to esophagitis but EKG showed nonspecific ST T wave changes.  Troponins were negative x3 and she was discharged to home with Carafate to be taken to 4 times a day and Protonix 40 mg to be taken twice daily.  Today Sonia reports persistent pain, though it has decreased in severity.  She is not taking Protonix and was not aware of ever having this prescription.  She unfortunately continues to smoke.  We also discussed that she may have a component of H. pylori present as she was never tested for full eradication.  She is due for repeat EGD to evaluate nonhealing ulcer as well as a repeat colonoscopy due to poor prep at last screening.  LG.

## 2023-11-09 NOTE — PHYSICAL EXAM CARDIOVASCULAR:
Patients daughter came to her appointment yesterday, she is requesting a letter stating she was here to be faxed to her FMLA: Attn: Jennifer Castillo, [phone].    no edema

## 2023-11-09 NOTE — HPI-OTHER HISTORIES
2/14/2022: Initial Gastroenterology Clinic Visit Ms. Bryan is a 66 year old female wtih past medical history of chronic tobacco use, lupus, arthritis, hyperlipidemia, anxiety/depression, who presents for evaluation of pancreatitis. Ms. Bryan presents with a family member. She experienced epigastric discomfort in January 2022 which would occur every 30 minutes and last for 10 minutes before returning. She did not associate the discomfort with the consumption of food. To evaluate, underwent CT A/P 1/31/2022 which showed mild fat stranding surrounding the pancreatic head and adjacent duodenum which could represent pancreatitis or peptic ulcer disease, fluid filled milldy prominent loop of small bowel which could represent enteritis. Underwent abdominal ultrasound 2/10/2022 to evaluate for cholelithiasis and assess if gallstones may have led to pancreatitis. Ultrasound showed normal right upper quadrant ultrasound without gallstones. CBC, chemistry panel, LFTs normal. She did acknowledge being on naproxen 3x per week for arthritis. Denies alcohol use but has smoked cigarettes for many years. Denies new medications. Denies changes in diet. Denies any recent illnesses. Ms. Bryan does not currently work. She has not undergone colon cancer screening.   4/7/2022: EGD One linear esophageal ulcer was found at the distal esophagus measuring 3 mm in size. 3 cm hiatal hernia was present. Two non-bleeding gastric ulcers with no bleeding were found in the gastric antrum with largest measuring 5 mm. Biopsied. Congested mucosa without active bleeding was found in the duodenal bulb, first portion of the duodenum, and second portion of the duodenum. Biopsied.  4/7/2022: Pathology from EGD (A) Duodenum, Bulb, Biopsy: ACTIVE PEPTIC DUODENITIS, PROBABLY SECONDARY TO H. PYLORI INFECTION; H. PYLORI ORGANISMS IDENTIFIED. Negative for Sprue, Dysplasia or Malignancy. (B) Stomach, Body, Biopsy: CHRONIC HELICOBACTER GASTRITIS; H. PYLORI ORGANISMS IDENTIFIED. NO EVIDENCE OF INTESTINAL METAPLASIA. Negative For Dysplasia or Malignancy. 4/7/2022: Colonoscopy Seven sessile polyps were found in the ascending colon ranging from 5-15 mm. Polypectomy performed with cold snare with largest polyp removed in piecemeal fashion. 4 mm polyp in the descending colon. Polypectomy performed. 10 mm polyp in the sigmoid colon. Polypectomy performed. Multiple small and large mouthed diverticula were found in the descending colon and sigmoid colon.  4/7/2022: Pathology from Colonoscopy (C) Colon, Ascending, Polypectomy: TUBULAR ADENOMA(S). (D) Colon, Descending, Polypectomy: HYPERPLASTIC POLYP(S). (E) Colon, Sigmoid, Polypectomy: HYPERPLASTIC POLYP(S).   4/18/2022: Gastroenterology Follow-Up Visit Ms. Bryan acknowledges that she was unable to undergo the MRI Pancreas. She does continue to smoke. She is in the process of decreasing NSAID use. She does have epigastric discomfort although this has slightly improved with PPI. Denies melena, hematochezia, unintentional weight loss.  9/2022 - COLON - aborted due to poor prep, recommend repeat within 6 months for evaluation of piecemeal polypectomy site EGD - superficial esophageal ulcer at distal esophagus, 3 cm hiatal hernia, positive HP - treated with pylera; recommendation to repeat in 8 weeks

## 2024-01-22 ENCOUNTER — OFFICE VISIT (OUTPATIENT)
Dept: URBAN - NONMETROPOLITAN AREA SURGERY CENTER 1 | Facility: SURGERY CENTER | Age: 69
End: 2024-01-22

## 2024-06-06 ENCOUNTER — TELEPHONE ENCOUNTER (OUTPATIENT)
Dept: URBAN - NONMETROPOLITAN AREA CLINIC 2 | Facility: CLINIC | Age: 69
End: 2024-06-06

## 2024-06-13 ENCOUNTER — OFFICE VISIT (OUTPATIENT)
Dept: URBAN - NONMETROPOLITAN AREA SURGERY CENTER 1 | Facility: SURGERY CENTER | Age: 69
End: 2024-06-13

## 2025-02-25 ENCOUNTER — OFFICE VISIT (OUTPATIENT)
Dept: URBAN - NONMETROPOLITAN AREA CLINIC 13 | Facility: CLINIC | Age: 70
End: 2025-02-25